# Patient Record
Sex: FEMALE | Race: WHITE | NOT HISPANIC OR LATINO | URBAN - METROPOLITAN AREA
[De-identification: names, ages, dates, MRNs, and addresses within clinical notes are randomized per-mention and may not be internally consistent; named-entity substitution may affect disease eponyms.]

---

## 2024-11-13 ENCOUNTER — APPOINTMENT (OUTPATIENT)
Dept: DERMATOLOGY | Facility: CLINIC | Age: 54
End: 2024-11-13
Payer: COMMERCIAL

## 2024-11-13 DIAGNOSIS — Z79.899 OTHER LONG TERM (CURRENT) DRUG THERAPY: ICD-10-CM

## 2024-11-13 DIAGNOSIS — L20.89 OTHER ATOPIC DERMATITIS: Primary | ICD-10-CM

## 2024-11-13 PROCEDURE — 99214 OFFICE O/P EST MOD 30 MIN: CPT | Performed by: NURSE PRACTITIONER

## 2024-11-13 PROCEDURE — 1036F TOBACCO NON-USER: CPT | Performed by: NURSE PRACTITIONER

## 2024-11-13 RX ORDER — CLOBETASOL PROPIONATE 0.5 MG/G
OINTMENT TOPICAL
COMMUNITY
Start: 2024-03-27

## 2024-11-13 RX ORDER — VALACYCLOVIR HYDROCHLORIDE 1 G/1
TABLET, FILM COATED ORAL
COMMUNITY
Start: 2024-06-02

## 2024-11-13 RX ORDER — SODIUM FLUORIDE 6.1 MG/ML
PASTE, DENTIFRICE DENTAL
COMMUNITY
Start: 2024-10-17

## 2024-11-13 RX ORDER — PREDNISONE 10 MG/1
TABLET ORAL
Qty: 42 TABLET | Refills: 0 | Status: SHIPPED | OUTPATIENT
Start: 2024-11-13

## 2024-11-13 RX ORDER — HYDROXYCHLOROQUINE SULFATE 200 MG/1
TABLET, FILM COATED ORAL
COMMUNITY
Start: 2024-11-11

## 2024-11-13 RX ORDER — RABEPRAZOLE SODIUM 20 MG/1
1 TABLET, DELAYED RELEASE ORAL
COMMUNITY
Start: 2024-10-11

## 2024-11-13 RX ORDER — POTASSIUM CITRATE 10 MEQ/1
10 TABLET, EXTENDED RELEASE ORAL
COMMUNITY
Start: 2024-07-18

## 2024-11-15 NOTE — PROGRESS NOTES
Subjective     Zahra Pittman is a 54 y.o. female who presents for the following: Eczema (Presents for examination of possible eczema. Pt mostly clear today , has a couple spots of flaring to arms, cheek, and chest. Pt has tried multiple topicals and states she is currently only using clobetasol to the spot on her face. Pt tried Dupixent and states she had a bad reaction after one dose. Referred by [facility] ) and Suspicious Skin Lesion (Abdomen. ).     Review of Systems:  No other skin or systemic complaints other than what is documented elsewhere in the note.    The following portions of the chart were reviewed this encounter and updated as appropriate:  Tobacco  Allergies  Meds  Problems  Med Hx  Surg Hx  Fam Hx         Skin Cancer History  No skin cancer on file.      Specialty Problems    None       Objective   Well appearing patient in no apparent distress; mood and affect are within normal limits.    A full examination was performed including scalp, head, eyes, ears, nose, lips, neck, chest, axillae, abdomen, back, buttocks, bilateral upper extremities, bilateral lower extremities, hands, feet, fingers, toes, fingernails, and toenails. All findings within normal limits unless otherwise noted below.    Assessment/Plan   1. Other atopic dermatitis  Erythematous scaly papules and plaques with overyling excoriation, worse on chest/trunk. 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 Has tried topical triamcinolone, topical clobetasol, tacrolimus ointment, and prednisone. Tried Dupixent but has to discontinue due to side effects (joint swelling and pain). BSA affected is >35%.     -Discussed nature of diagnosis and treatment options  -When the rash is active, apply topical corticosteroids to the active areas of the rash as prescribed  -Recommend to use liberal emollients twice daily, one time applied immediately after shower while skin is still slightly damp. Use emollients to all areas of the body  that may be affected and use whether the rash is active or not. Use prescription medications before applying emollients.  -Discussed with/information given to the patient on the risks, benefits and alternatives of the usage of topical corticosteroids, including but not limited to: atrophy (thinning of the skin), striae (stretch marks), telangiectasia (blood vessel growth), and dyspigmentation (discoloration of the skin).  -Recommend to limit long-term use of topical corticosteroids to less than 14 days per month to reduce risk of side effects.  -Recommend: Will review [facility] notes and call to discuss treatment options. Discussed Rinvoq, will consider.   - Risks, benefits, and side effects discussed. Patient understood and agrees with the plan.       Related Medications  predniSONE (Deltasone) 10 mg tablet  Take 4 tabs in AM for 3 days, then 3 tabs in AM for 3 days, then 2 tabs in AM for 7 days, then 1 tab in AM for 7 days.      Follow up in 3 months. Please call me if there are any changes or development of concerning symptoms (lesion/skin condition is changing, bleeding, enlarging, or worsening).

## 2024-11-18 ENCOUNTER — TELEPHONE (OUTPATIENT)
Dept: DERMATOLOGY | Facility: CLINIC | Age: 54
End: 2024-11-18
Payer: COMMERCIAL

## 2024-11-18 DIAGNOSIS — Z79.899 OTHER LONG TERM (CURRENT) DRUG THERAPY: ICD-10-CM

## 2024-11-18 DIAGNOSIS — L20.89 OTHER ATOPIC DERMATITIS: Primary | ICD-10-CM

## 2024-11-18 NOTE — TELEPHONE ENCOUNTER
Pt contacted office asking where she can get her labs done as there are no  labs near her home or work. Advised pt that she can use any lab she just will need the physical lab requisitions if she goes to a lab outside of . Pt requested that lab requisitions be faxed to the [facility] lab near her home. Lab requisitions were faxed at this time to [phone] with success. Advised pt to contact lab prior to going for labs to ensure the lab requisitions were received. Also advised pt that these labs need to be completed while fasting. Pt verbalized understanding and had no further questions at this time.     Amelia Bergeron, RN

## 2024-12-05 ENCOUNTER — APPOINTMENT (OUTPATIENT)
Dept: DERMATOLOGY | Facility: CLINIC | Age: 54
End: 2024-12-05
Payer: COMMERCIAL

## 2024-12-11 ENCOUNTER — SPECIALTY PHARMACY (OUTPATIENT)
Dept: PHARMACY | Facility: CLINIC | Age: 54
End: 2024-12-11

## 2024-12-11 ENCOUNTER — APPOINTMENT (OUTPATIENT)
Dept: DERMATOLOGY | Facility: CLINIC | Age: 54
End: 2024-12-11
Payer: COMMERCIAL

## 2024-12-11 DIAGNOSIS — L20.89 OTHER ATOPIC DERMATITIS: Primary | ICD-10-CM

## 2024-12-11 PROCEDURE — 99213 OFFICE O/P EST LOW 20 MIN: CPT | Performed by: NURSE PRACTITIONER

## 2024-12-11 PROCEDURE — 1036F TOBACCO NON-USER: CPT | Performed by: NURSE PRACTITIONER

## 2024-12-11 NOTE — PROGRESS NOTES
Subjective     Zahra Pittman is a 54 y.o. female who presents for the following: Eczema (1 month follow up. Pt completed prednisone taper and states she is currently clear. ).     Review of Systems:  No other skin or systemic complaints other than what is documented elsewhere in the note.    The following portions of the chart were reviewed this encounter and updated as appropriate:  Tobacco  Allergies  Meds  Problems  Med Hx  Surg Hx  Fam Hx       Skin Cancer History  No skin cancer on file.    Specialty Problems    None    Past Medical History:  Zahra Pittman  has no past medical history on file.    Past Surgical History:  Zahra Pittman  has no past surgical history on file.    Family History:  Patient family history is not on file.    Social History:  Zahra Pittman  reports that she has never smoked. She has never used smokeless tobacco. No history on file for alcohol use and drug use.    Allergies:  Dupixent pen [dupilumab]    Current Medications / CAM's:    Current Outpatient Medications:     clobetasol (Temovate) 0.05 % ointment, apply to affected area on chest twice a day for 2 weeks. repeat as needed for flares., Disp: , Rfl:     hydroxychloroquine (Plaquenil) 200 mg tablet, , Disp: , Rfl:     potassium citrate CR (Urocit-K-10) 10 mEq ER tablet, Take 1 tablet (10 mEq) by mouth 2 times daily (morning and late afternoon)., Disp: , Rfl:     predniSONE (Deltasone) 10 mg tablet, Take 4 tabs in AM for 3 days, then 3 tabs in AM for 3 days, then 2 tabs in AM for 7 days, then 1 tab in AM for 7 days., Disp: 42 tablet, Rfl: 0    RABEprazole (Aciphex) EC tablet, Take 1 tablet (20 mg) by mouth every 12 hours., Disp: , Rfl:     Sodium Fluoride 5000 Dry Mouth 1.1 % dental paste, BRUSH TEETH ONCE A DAY, Disp: , Rfl:     valACYclovir (Valtrex) 1 gram tablet, , Disp: , Rfl:      Objective   Well appearing patient in no apparent distress; mood and affect are within normal limits.    A focused skin examination  was performed. All findings within normal limits unless otherwise noted below.    Assessment/Plan   1. Other atopic dermatitis  Erythematous scaly papules and plaques with overyling excoriation, worse on chest/trunk. 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 Has tried topical triamcinolone, topical clobetasol, tacrolimus ointment, and prednisone. Tried Dupixent but has to discontinue due to side effects (joint swelling and pain). BSA affected is >35%.     -Discussed nature of diagnosis and treatment options  -When the rash is active, apply topical corticosteroids to the active areas of the rash as prescribed  -Recommend to use liberal emollients twice daily, one time applied immediately after shower while skin is still slightly damp. Use emollients to all areas of the body that may be affected and use whether the rash is active or not. Use prescription medications before applying emollients.  -Discussed with/information given to the patient on the risks, benefits and alternatives of the usage of topical corticosteroids, including but not limited to: atrophy (thinning of the skin), striae (stretch marks), telangiectasia (blood vessel growth), and dyspigmentation (discoloration of the skin).  -Recommend to limit long-term use of topical corticosteroids to less than 14 days per month to reduce risk of side effects.  -Recommend: Will review [facility] notes and call to discuss treatment options. Discussed Rinvoq, will send prescription.   - Labs reviewed, essentially within normal limits. Tspot and Hep B/C negative  - Risks, benefits, and side effects discussed. Patient understood and agrees with the plan.       Related Medications  predniSONE (Deltasone) 10 mg tablet  Take 4 tabs in AM for 3 days, then 3 tabs in AM for 3 days, then 2 tabs in AM for 7 days, then 1 tab in AM for 7 days.    Follow up in 3 months. Please call me if there are any changes or development of concerning symptoms (lesion/skin condition is  changing, bleeding, enlarging, or worsening).

## 2024-12-16 DIAGNOSIS — L20.89 OTHER ATOPIC DERMATITIS: ICD-10-CM

## 2024-12-16 NOTE — PROGRESS NOTES
Rinvoq prior authorization approved until 06/15/2025. Patient must fill with [facility]. Clinical pharmacist routed Rinvoq prescription to [facility] per insurance mandate. Baseline labs WNL.

## 2025-01-29 ENCOUNTER — APPOINTMENT (OUTPATIENT)
Dept: DERMATOLOGY | Facility: CLINIC | Age: 55
End: 2025-01-29
Payer: COMMERCIAL

## 2025-04-02 ENCOUNTER — APPOINTMENT (OUTPATIENT)
Dept: DERMATOLOGY | Facility: CLINIC | Age: 55
End: 2025-04-02
Payer: COMMERCIAL